# Patient Record
Sex: FEMALE | Race: WHITE
[De-identification: names, ages, dates, MRNs, and addresses within clinical notes are randomized per-mention and may not be internally consistent; named-entity substitution may affect disease eponyms.]

---

## 2021-07-21 ENCOUNTER — HOSPITAL ENCOUNTER (EMERGENCY)
Dept: HOSPITAL 43 - DL.ED | Age: 36
Discharge: HOME | End: 2021-07-21
Payer: SELF-PAY

## 2021-07-21 DIAGNOSIS — K02.9: ICD-10-CM

## 2021-07-21 DIAGNOSIS — J01.90: ICD-10-CM

## 2021-07-21 DIAGNOSIS — K04.7: Primary | ICD-10-CM

## 2021-07-21 NOTE — EDM.PDOC
ED HPI GENERAL MEDICAL PROBLEM





- General


Chief Complaint: Respiratory Problem


Stated Complaint: COLD, COUGH


Time Seen by Provider: 07/21/21 17:40


Source of Information: Reports: Patient, RN, RN Notes Reviewed


History Limitations: Reports: No Limitations





- History of Present Illness


INITIAL COMMENTS - FREE TEXT/NARRATIVE: 


Lynda is a 34 y/o female who presents to the ED via personal vehicle with 

complaints of dental pain and upper respiratory symptoms.  The patient reports 

her cough, sore throat, and sinus congestion have been ongoing for approximately

two weeks for which she has been taking OTC antitussives.  Three days ago she 

began to experience significant pain to her tooth 18.  She notes she has not 

been to a dentist for this tooth as her dental insurance does not take effect 

until August 1st.  She has attempted OTC analgesics with no alleviation in pain.

 She denies fever, shaking chills, palpitations, nausea, vomiting, or diarrhea. 

The patient attest to smoking 1/2 pack of cigarettes per day; she denies alcohol

or recreational drug use.








Past Medical History


Psychiatric History: Reports: Depression





Social & Family History





- Tobacco Use


Tobacco Use Status *Q: Never Tobacco User





- Caffeine Use


Caffeine Use: Reports: None





- Recreational Drug Use


Recreational Drug Use: No





ED ROS GENERAL





- Review of Systems


Review Of Systems: Comprehensive ROS is negative, except as noted in HPI.





ED EXAM, GENERAL





- Physical Exam


Exam: See Below


Exam Limited By: No Limitations


General Appearance: Alert, Anxious, Mild Distress (Tooth pain)


Eye Exam: Bilateral Eye: EOMI, Normal Inspection, PERRL (3mm)


Ears: Normal External Exam, Normal Canal, Hearing Grossly Normal, Normal TMs


Nose: Normal Inspection, Normal Mucosa, No Blood


Throat/Mouth: Normal Lips, Normal Voice, No Airway Compromise, Other (Dental 

pain to tooth 18)


Head: Atraumatic, Normocephalic, Sinus Tenderness


Neck: Normal Inspection, Supple, Non-Tender, Full Range of Motion, 

Lymphadenopathy (R) (To anterior cervical chain).  No: Lymphadenopathy (L)


Respiratory/Chest: No Respiratory Distress, Lungs Clear, Normal Breath Sounds, 

No Accessory Muscle Use, Chest Non-Tender.  No: Crackles, Rales, Rhonchi, 

Wheezing, Retractions, Prolonged Expiration


Cardiovascular: Normal Peripheral Pulses, Regular Rate, Rhythm, No Edema, No 

Gallop, No JVD, No Murmur, No Rub


Peripheral Pulses: 2+: Radial (L), Radial (R)


GI/Abdominal: Normal Bowel Sounds, Soft, Non-Tender, No Distention, No Abnormal 

Bruit, No Mass, Pelvis Stable


 (Female) Exam: Deferred


Rectal (Female) Exam: Deferred


Back Exam: Normal Inspection, Full Range of Motion


Extremities: Normal Inspection, Normal Range of Motion, Non-Tender, Normal 

Capillary Refill, No Pedal Edema


Neurological: Alert, Oriented, CN II-XII Intact, Normal Cognition, Normal Gait, 

No Motor/Sensory Deficits


Psychiatric: Normal Affect, Anxious


Skin Exam: Warm, Dry, Intact, Normal Color, No Rash.  No: Cyanosis, Ecchymosis, 

Erythema, Jaundice, Mottled, Pallor





Course





- Vital Signs


Last Recorded V/S: 


                                Last Vital Signs











Temp  97.2 F   07/21/21 17:34


 


Pulse  66   07/21/21 17:34


 


Resp  14   07/21/21 17:34


 


BP  119/66   07/21/21 17:34


 


Pulse Ox  99   07/21/21 17:34














- Re-Assessments/Exams


Free Text/Narrative Re-Assessment/Exam: 





07/21/21 7/21/21


Findings of examination reviewed with patient.  Will treat dental infection with

Augmentin.  Discussed supportive cares for dental pain with patient.  Patient 

instructed to follow up with primary care dentist regarding today's visit.  Red 

flag signs and symptoms which would warrant reevaluation reviewed.  Patient 

verbalized understanding and agreement with the plan of care.





Departure





- Departure


Time of Disposition: 17:57


Disposition: Home, Self-Care 01


Condition: Good


Clinical Impression: 


 Infected dental caries





Sinusitis


Qualifiers:


 Sinusitis location: unspecified location Chronicity: acute Recurrence: non-

recurrent Qualified Code(s): J01.90 - Acute sinusitis, unspecified








- Discharge Information


*PRESCRIPTION DRUG MONITORING PROGRAM REVIEWED*: Not Applicable


*COPY OF PRESCRIPTION DRUG MONITORING REPORT IN PATIENT LINDY: Not Applicable


Instructions:  Dental Abscess, Easy-to-Read, Sinusitis, Adult, Easy-to-Read


Forms:  ED Department Discharge


Additional Instructions: 


Rx: Augmentin





1.) Take all of your antibiotic until gone, even as symptoms improve. 


2.) Follow up with primary care dentist for evaluation and treatment of tooth. 


3.) Continue with supportive cares for upper respiratory infection. 


4.) Follow up with primary care, or return to the emergency department, with any

worsening or persistent symptoms despite medications. 





Sepsis Event Note (ED)





- Evaluation


Sepsis Screening Result: No Definite Risk

## 2022-04-03 ENCOUNTER — HOSPITAL ENCOUNTER (EMERGENCY)
Dept: HOSPITAL 43 - DL.ED | Age: 37
Discharge: HOME | End: 2022-04-03
Payer: MEDICAID

## 2022-04-03 DIAGNOSIS — Z72.0: ICD-10-CM

## 2022-04-03 DIAGNOSIS — Z88.8: ICD-10-CM

## 2022-04-03 DIAGNOSIS — Z20.822: ICD-10-CM

## 2022-04-03 DIAGNOSIS — J01.40: Primary | ICD-10-CM

## 2022-04-03 DIAGNOSIS — Z91.040: ICD-10-CM

## 2022-04-03 LAB — SARS-COV-2 RNA RESP QL NAA+PROBE: NEGATIVE

## 2022-05-18 ENCOUNTER — HOSPITAL ENCOUNTER (EMERGENCY)
Dept: HOSPITAL 43 - DL.ED | Age: 37
Discharge: HOME | End: 2022-05-18
Payer: MEDICAID

## 2022-05-18 DIAGNOSIS — M25.551: Primary | ICD-10-CM

## 2022-05-18 DIAGNOSIS — Z91.040: ICD-10-CM

## 2022-05-18 DIAGNOSIS — E66.9: ICD-10-CM

## 2022-05-18 DIAGNOSIS — Z88.8: ICD-10-CM

## 2022-05-18 DIAGNOSIS — Z72.0: ICD-10-CM

## 2022-05-18 DIAGNOSIS — Z79.84: ICD-10-CM
